# Patient Record
Sex: FEMALE | Race: AMERICAN INDIAN OR ALASKA NATIVE
[De-identification: names, ages, dates, MRNs, and addresses within clinical notes are randomized per-mention and may not be internally consistent; named-entity substitution may affect disease eponyms.]

---

## 2021-02-21 ENCOUNTER — HOSPITAL ENCOUNTER (EMERGENCY)
Dept: HOSPITAL 5 - ED | Age: 26
Discharge: HOME | End: 2021-02-21
Payer: COMMERCIAL

## 2021-02-21 VITALS — SYSTOLIC BLOOD PRESSURE: 118 MMHG | DIASTOLIC BLOOD PRESSURE: 56 MMHG

## 2021-02-21 DIAGNOSIS — F17.200: ICD-10-CM
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
## 2021-02-21 DIAGNOSIS — Z98.51: ICD-10-CM
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
## 2021-02-21 DIAGNOSIS — Z79.899: ICD-10-CM

## 2021-02-21 DIAGNOSIS — M62.838: Primary | ICD-10-CM

## 2021-02-21 PROCEDURE — 99282 EMERGENCY DEPT VISIT SF MDM: CPT
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
## 2022-01-05 ENCOUNTER — HOSPITAL ENCOUNTER (EMERGENCY)
Dept: HOSPITAL 5 - ED | Age: 27
LOS: 1 days | Discharge: HOME | End: 2022-01-06
Payer: COMMERCIAL

## 2022-01-05 VITALS — DIASTOLIC BLOOD PRESSURE: 68 MMHG | SYSTOLIC BLOOD PRESSURE: 115 MMHG

## 2022-01-05 DIAGNOSIS — Z98.51: ICD-10-CM

## 2022-01-05 DIAGNOSIS — F17.200: ICD-10-CM

## 2022-01-05 DIAGNOSIS — N39.0: Primary | ICD-10-CM

## 2022-01-05 LAB
ALBUMIN SERPL-MCNC: 4.5 G/DL (ref 3.9–5)
ALT SERPL-CCNC: 10 UNITS/L (ref 7–56)
BASOPHILS # (AUTO): 0.1 K/MM3 (ref 0–0.1)
BASOPHILS NFR BLD AUTO: 0.6 % (ref 0–1.8)
BILIRUB UR QL STRIP: (no result)
BLOOD UR QL VISUAL: (no result)
BUN SERPL-MCNC: 8 MG/DL (ref 7–17)
BUN/CREAT SERPL: 20 %
CALCIUM SERPL-MCNC: 9.5 MG/DL (ref 8.4–10.2)
EOSINOPHIL # BLD AUTO: 0.1 K/MM3 (ref 0–0.4)
EOSINOPHIL NFR BLD AUTO: 0.7 % (ref 0–4.3)
HCT VFR BLD CALC: 43.8 % (ref 30.3–42.9)
HEMOLYSIS INDEX: 4
HGB BLD-MCNC: 14.3 GM/DL (ref 10.1–14.3)
LYMPHOCYTES # BLD AUTO: 2.8 K/MM3 (ref 1.2–5.4)
LYMPHOCYTES NFR BLD AUTO: 28.6 % (ref 13.4–35)
MCHC RBC AUTO-ENTMCNC: 33 % (ref 30–34)
MCV RBC AUTO: 96 FL (ref 79–97)
MONOCYTES # (AUTO): 0.7 K/MM3 (ref 0–0.8)
MONOCYTES % (AUTO): 7.3 % (ref 0–7.3)
PH UR STRIP: 6 [PH] (ref 5–7)
PLATELET # BLD: 366 K/MM3 (ref 140–440)
PROT UR STRIP-MCNC: (no result) MG/DL
RBC # BLD AUTO: 4.58 M/MM3 (ref 3.65–5.03)
RBC #/AREA URNS HPF: 2 /HPF (ref 0–6)
UROBILINOGEN UR-MCNC: < 2 MG/DL (ref ?–2)
WBC #/AREA URNS HPF: 7 /HPF (ref 0–6)

## 2022-01-05 PROCEDURE — 84703 CHORIONIC GONADOTROPIN ASSAY: CPT

## 2022-01-05 PROCEDURE — 36415 COLL VENOUS BLD VENIPUNCTURE: CPT

## 2022-01-05 PROCEDURE — 85025 COMPLETE CBC W/AUTO DIFF WBC: CPT

## 2022-01-05 PROCEDURE — 81001 URINALYSIS AUTO W/SCOPE: CPT

## 2022-01-05 PROCEDURE — 80053 COMPREHEN METABOLIC PANEL: CPT

## 2022-01-05 PROCEDURE — 99283 EMERGENCY DEPT VISIT LOW MDM: CPT

## 2022-01-06 NOTE — EMERGENCY DEPARTMENT REPORT
ED Abdominal Pain HPI





- General


Chief Complaint: Abdominal Pain


Stated Complaint: ABDOMINAL PAIN


Time Seen by Provider: 01/06/22 00:30


Source: EMS


Mode of arrival: Stretcher


Limitations: No Limitations





- History of Present Illness


Initial Comments: 





26-year-old American female presents emerged part complaining of a 4 to 5-day 

history of abdominal pain rating to her left flank going around towards her back

down towards the lower abdomen.  She reports no hemoptysis no hematemesis 

hematochezia no no fever, chills, sweats pain is sharp and throbbing in nature 

when present and increased urinary frequency.  She reports no traumatic events


MD Complaint: abdominal pain, flank pain


Severity scale (0 -10): 0





- Related Data


                                  Previous Rx's











 Medication  Instructions  Recorded  Last Taken  Type


 


Ketorolac [Toradol] 10 mg PO Q6H PRN #15 tablet 02/21/21 Unknown Rx


 


methOCARBAMOL [Robaxin TAB] 750 mg PO Q8H PRN #14 tablet 02/21/21 Unknown Rx


 


Nitrofurantoin Mono/M-Cryst 100 mg PO Q12HR #20 capsule 01/06/22 Unknown Rx





[Macrobid CAP]    


 


Phenazopyridine [Pyridium] 200 mg PO TID #10 tab 01/06/22 Unknown Rx











                                    Allergies











Allergy/AdvReac Type Severity Reaction Status Date / Time


 


No Known Allergies Allergy   Verified 02/21/21 21:27














ED Review of Systems


ROS: 


Stated complaint: ABDOMINAL PAIN


Other details as noted in HPI





Comment: All other systems reviewed and negative





ED Past Medical Hx





- Surgical History


Additional Surgical History: tubal ligation





- Social History


Smoking Status: Current Every Day Smoker


Substance Use Type: Alcohol





- Medications


Home Medications: 


                                Home Medications











 Medication  Instructions  Recorded  Confirmed  Last Taken  Type


 


Ketorolac [Toradol] 10 mg PO Q6H PRN #15 tablet 02/21/21  Unknown Rx


 


methOCARBAMOL [Robaxin TAB] 750 mg PO Q8H PRN #14 tablet 02/21/21  Unknown Rx


 


Nitrofurantoin Mono/M-Cryst 100 mg PO Q12HR #20 capsule 01/06/22  Unknown Rx





[Macrobid CAP]     


 


Phenazopyridine [Pyridium] 200 mg PO TID #10 tab 01/06/22  Unknown Rx














ED Physical Exam





- General


Limitations: No Limitations


General appearance: alert, in no apparent distress





- Head


Head exam: Present: atraumatic, normocephalic





- Eye


Eye exam: Present: normal appearance, PERRL, EOMI


Pupils: Present: normal accommodation





- ENT


ENT exam: Present: normal exam, mucous membranes moist





- Neck


Neck exam: Present: normal inspection, full ROM





- Respiratory


Respiratory exam: Present: normal lung sounds bilaterally.  Absent: respiratory 

distress





- Cardiovascular


Cardiovascular Exam: Present: regular rate, normal rhythm.  Absent: systolic 

murmur, diastolic murmur, rubs, gallop





- GI/Abdominal


GI/Abdominal exam: Present: soft, normal bowel sounds





- Extremities Exam


Extremities exam: Present: normal inspection





- Back Exam


Back exam: Present: normal inspection





- Neurological Exam


Neurological exam: Present: alert, oriented X3





- Psychiatric


Psychiatric exam: Present: normal affect, normal mood





- Skin


Skin exam: Present: warm, dry, intact, normal color.  Absent: rash





ED Course





                                   Vital Signs











  01/05/22





  22:01


 


Temperature 98.1 F


 


Pulse Rate 66


 


Respiratory 16





Rate 


 


Blood Pressure 115/68





[Left] 


 


O2 Sat by Pulse 100





Oximetry 














ED Medical Decision Making





- Lab Data


Result diagrams: 


                                 01/05/22 23:03





                                 01/05/22 23:03





- Medical Decision Making





This patient presents to the emergency department with symptoms consistent with 

acute uncomplicated cystitis.  No systemic symptoms.  Not septic.  She is well-

appearing.  Low suspicion for acute pyelonephritis given the lack of fever, CVA 

tenderness, or systemic features.  Low suspicion for for kidney stone or 

infected stone.  Not in age range for pregnancy and her history and and 

presentation are complicated.  No no indications for labs or imaging at this 

time.


Critical care attestation.: 


If time is entered above; I have spent that time in minutes in the direct care 

of this critically ill patient, excluding procedure time.








ED Disposition


Clinical Impression: 


 UTI (urinary tract infection)





Disposition: 01 HOME / SELF CARE / HOMELESS


Is pt being admited?: No


Does the pt Need Aspirin: No


Condition: Stable


Instructions:  Urinalysis Test, Urinary Tract Infection, Adult, Abdominal Pain 

(ED)


Additional Instructions: 


You have been evaluated in the emergency department today for for flank pain and

 urinary symptoms.  Evaluation including urinalysis suggest that her symptoms 

are due to urinary tract infection.  Please take your prescribed antibiotics for

 the full course of the medication as directed and follow-up with primary care 

provider within 3 days.  Return to emergency department if you experience fevers

 of 101.4 or greater, worsening or uncontrolled pain, vomiting, flank pain or 

any other concerning symptoms


Prescriptions: 


Nitrofurantoin Mono/M-Cryst [Macrobid CAP] 100 mg PO Q12HR #20 capsule


Phenazopyridine [Pyridium] 200 mg PO TID #10 tab


Referrals: 


[facility] [Provider Group] - 3-5 Days


PRIMARY CARE,MD [Primary Care Provider] - 3-5 Days